# Patient Record
Sex: MALE | Race: AMERICAN INDIAN OR ALASKA NATIVE | NOT HISPANIC OR LATINO | URBAN - METROPOLITAN AREA
[De-identification: names, ages, dates, MRNs, and addresses within clinical notes are randomized per-mention and may not be internally consistent; named-entity substitution may affect disease eponyms.]

---

## 2024-04-28 ENCOUNTER — OFFICE VISIT (OUTPATIENT)
Dept: URGENT CARE | Facility: CLINIC | Age: 39
End: 2024-04-28
Payer: COMMERCIAL

## 2024-04-28 VITALS
OXYGEN SATURATION: 98 % | TEMPERATURE: 97.8 F | SYSTOLIC BLOOD PRESSURE: 122 MMHG | DIASTOLIC BLOOD PRESSURE: 76 MMHG | RESPIRATION RATE: 20 BRPM | HEART RATE: 96 BPM

## 2024-04-28 DIAGNOSIS — S61.411A LACERATION OF RIGHT HAND WITHOUT FOREIGN BODY, INITIAL ENCOUNTER: Primary | ICD-10-CM

## 2024-04-28 PROCEDURE — 99203 OFFICE O/P NEW LOW 30 MIN: CPT | Performed by: PHYSICIAN ASSISTANT

## 2024-04-28 PROCEDURE — 12002 RPR S/N/AX/GEN/TRNK2.6-7.5CM: CPT | Performed by: PHYSICIAN ASSISTANT

## 2024-04-28 RX ORDER — AMOXICILLIN AND CLAVULANATE POTASSIUM 875; 125 MG/1; MG/1
1 TABLET, FILM COATED ORAL 2 TIMES DAILY
Qty: 10 TABLET | Refills: 0 | Status: SHIPPED | OUTPATIENT
Start: 2024-04-28 | End: 2024-05-03

## 2024-04-28 ASSESSMENT — ENCOUNTER SYMPTOMS
WOUND: 1
ROS SKIN COMMENTS: LACERATION TO LEFT HAND

## 2024-04-28 ASSESSMENT — PAIN SCALES - GENERAL: PAINLEVEL: 2

## 2024-04-28 NOTE — PROGRESS NOTES
Subjective   Patient ID: Dalia Angulo is a 39 y.o. male.    Patient is a 39-year-old male who complains of laceration to the interdigital skin between his left second and third fingers secondary to a blunt trauma injury that the patient sustained prior to arrival this morning.  Patient was playing cricket when the ball struck his left second and third fingers.  Patient reports that he believes that his left second and third fingers were spread far apart and the interdigital skin torn.  Patient reports mild bleeding to the site and denies paresthesia or paralysis to his left second and third fingers.  Patient states he is able to flex and extend same without difficulty.  Patient has no additional complaints of pain or injury to his remaining left fingers and hand.  Patient states that his last tetanus immunization was 2 years ago.      Hand Injury  The following portions of the chart were reviewed this encounter and updated as appropriate:       Review of Systems   Skin:  Positive for wound.        Laceration to Left Hand   All other systems reviewed and are negative.  Objective   Physical Exam  Vitals and nursing note reviewed.   Constitutional:       Appearance: Normal appearance. He is normal weight.   Cardiovascular:      Pulses: Normal pulses.   Pulmonary:      Effort: Pulmonary effort is normal.      Breath sounds: Normal breath sounds.   Musculoskeletal:         General: No swelling, tenderness, deformity or signs of injury. Normal range of motion.   Skin:     General: Skin is warm and dry.      Capillary Refill: Capillary refill takes less than 2 seconds.      Findings: No bruising or erythema.      Comments: 2 cm irregular linear laceration is noted to the interdigital skin of the left second and third fingers.  Wound depth is 4 mm and the underlying fascial structures are fully intact.  Mild capillary bleeding is noted on exam and no foreign body is noted.  There is no visible muscle or bone tissue and the  tendon is visualized and confirmed to be fully intact.  MSP to the left second and third fingers is intact and the patient demonstrates full range of motion of same.  Fine and gross motor movements are intact and  is strong and equal.  Remainder of exam to the skin of the left fingers and hand is unremarkable.   Neurological:      General: No focal deficit present.      Mental Status: He is alert and oriented to person, place, and time.   Psychiatric:         Mood and Affect: Mood normal.         Behavior: Behavior normal.         Thought Content: Thought content normal.         Judgment: Judgment normal.     Assessment/Plan   Physical exam findings as noted above.  Patient was provided with prescription for Augmentin 875-125 mg and wound care instructions were discussed.  Patient was advised to return to this urgent care facility on Monday, 06 May 2024 for removal of the Prolene sutures.  Patient verbalizes clear understanding of the above instructions.    CLINICAL IMPRESSION:  2 cm Laceration Left Hand     Diagnoses and all orders for this visit:  Laceration of right hand without foreign body, initial encounter  -     amoxicillin-pot clavulanate (Augmentin) 875-125 mg tablet; Take 1 tablet by mouth 2 times a day for 5 days.    Patient ID: Dalia Angulo is a 39 y.o. male.    Laceration Repair    Date/Time: 4/28/2024 12:07 PM    Performed by: Allan Matute PA-C  Authorized by: Allan Matute PA-C    Comments:      Wound cleaned and irrigated per routine.  3 mL 1% lidocaine without epinephrine was infiltrated to the site with excellent effect noted.  Four 4-0 Vicryl sutures and two 4-0 Prolene sutures were placed myself in simple interrupted fashion with excellent approximation of the wound edges.  Antibiotic ointment was applied and a sterile dressing placed and secured with Coban by the RN.  MSP noted to be intact to the left second and third fingers pre- and post-suture placement.  Patient tolerated the  procedure very well.    Patient disposition: Home

## 2024-05-06 ENCOUNTER — OFFICE VISIT (OUTPATIENT)
Dept: URGENT CARE | Facility: CLINIC | Age: 39
End: 2024-05-06
Payer: COMMERCIAL

## 2024-05-06 VITALS — WEIGHT: 150 LBS | TEMPERATURE: 97.8 F

## 2024-05-06 DIAGNOSIS — Z48.02 ENCOUNTER FOR REMOVAL OF SUTURES: Primary | ICD-10-CM

## 2024-05-06 PROCEDURE — 99024 POSTOP FOLLOW-UP VISIT: CPT | Performed by: PHYSICIAN ASSISTANT

## 2024-05-06 NOTE — PROGRESS NOTES
Subjective   Patient ID: Dalia Angulo is a 39 y.o. male.    Patient is a 39-year-old male who arrives for removal of sutures that were placed to a laceration to his right hand on 28 April 2024.  Patient was seen at this urgent care facility on that date and for 4-0 Vicryl and two 4-0 Prolene sutures were placed myself to a laceration to the interdigital skin between the right second and third fingers.  Patient states the wound has been healing well and he has noted no bleeding, serous or purulent fluid.  Patient did complete the antibiotic that was prescribed as directed.  Patient states he has been applying antibiotic ointment to the site and reports no pain or swelling.  Patient has no complaints at the present time and arrives only for removal of the sutures as instructed.      Suture / Staple Removal     The following portions of the chart were reviewed this encounter and updated as appropriate:       Review of Systems   Skin:         Suture Removal Right Hand   All other systems reviewed and are negative.  Objective   Physical Exam  Vitals and nursing note reviewed.   Constitutional:       Appearance: Normal appearance. He is normal weight.   Cardiovascular:      Pulses: Normal pulses.   Pulmonary:      Effort: Pulmonary effort is normal.      Breath sounds: Normal breath sounds.   Musculoskeletal:         General: No swelling, tenderness, deformity or signs of injury. Normal range of motion.   Skin:     General: Skin is warm and dry.      Capillary Refill: Capillary refill takes less than 2 seconds.      Findings: No bruising or erythema.      Comments: Laceration to the interdigital skin between the right second and third fingers is noted to be healing extremely well.  All sutures are noted to be intact and in appropriate position.  There is no induration or fluctuance noted to the site.  No erythema, bleeding, serous or purulent fluid is noted.  MSP to the right fingers is fully intact and  is strong and  equal.   Neurological:      General: No focal deficit present.      Mental Status: He is alert and oriented to person, place, and time.   Psychiatric:         Mood and Affect: Mood normal.         Behavior: Behavior normal.         Thought Content: Thought content normal.         Judgment: Judgment normal.     Assessment/Plan   Physical exam findings as noted above.  Two 4-0 Prolene sutures were removed myself without complication and the count confirmed by the patient.  Additional wound care instructions were discussed and the patient verbalizes excellent understanding of same.    CLINICAL IMPRESSION:  Suture Removal Right Hand    Diagnoses and all orders for this visit:  Encounter for removal of sutures    Patient disposition: Home

## 2024-11-04 ENCOUNTER — APPOINTMENT (OUTPATIENT)
Dept: PRIMARY CARE | Facility: CLINIC | Age: 39
End: 2024-11-04
Payer: COMMERCIAL

## 2024-11-04 VITALS
SYSTOLIC BLOOD PRESSURE: 120 MMHG | DIASTOLIC BLOOD PRESSURE: 84 MMHG | HEIGHT: 70 IN | TEMPERATURE: 97.4 F | BODY MASS INDEX: 21.76 KG/M2 | WEIGHT: 152 LBS | OXYGEN SATURATION: 97 % | HEART RATE: 72 BPM

## 2024-11-04 DIAGNOSIS — Z00.00 ROUTINE GENERAL MEDICAL EXAMINATION AT A HEALTH CARE FACILITY: Primary | ICD-10-CM

## 2024-11-04 DIAGNOSIS — Z13.29 SCREENING FOR ENDOCRINE, METABOLIC AND IMMUNITY DISORDER: ICD-10-CM

## 2024-11-04 DIAGNOSIS — Z13.228 SCREENING FOR ENDOCRINE, METABOLIC AND IMMUNITY DISORDER: ICD-10-CM

## 2024-11-04 DIAGNOSIS — E55.9 VITAMIN D DEFICIENCY: ICD-10-CM

## 2024-11-04 DIAGNOSIS — Z13.0 SCREENING FOR ENDOCRINE, METABOLIC AND IMMUNITY DISORDER: ICD-10-CM

## 2024-11-04 DIAGNOSIS — E78.5 HYPERLIPIDEMIA, UNSPECIFIED HYPERLIPIDEMIA TYPE: ICD-10-CM

## 2024-11-04 PROBLEM — S61.411A LACERATION OF RIGHT HAND WITHOUT FOREIGN BODY: Status: RESOLVED | Noted: 2024-04-28 | Resolved: 2024-11-04

## 2024-11-04 PROBLEM — Z48.02 ENCOUNTER FOR REMOVAL OF SUTURES: Status: RESOLVED | Noted: 2024-05-06 | Resolved: 2024-11-04

## 2024-11-04 PROCEDURE — 99203 OFFICE O/P NEW LOW 30 MIN: CPT | Performed by: INTERNAL MEDICINE

## 2024-11-04 PROCEDURE — 90471 IMMUNIZATION ADMIN: CPT | Performed by: INTERNAL MEDICINE

## 2024-11-04 PROCEDURE — 1036F TOBACCO NON-USER: CPT | Performed by: INTERNAL MEDICINE

## 2024-11-04 PROCEDURE — 90656 IIV3 VACC NO PRSV 0.5 ML IM: CPT | Performed by: INTERNAL MEDICINE

## 2024-11-04 PROCEDURE — 3008F BODY MASS INDEX DOCD: CPT | Performed by: INTERNAL MEDICINE

## 2024-11-04 RX ORDER — PRAVASTATIN SODIUM 20 MG/1
20 TABLET ORAL DAILY
COMMUNITY

## 2024-11-04 ASSESSMENT — ENCOUNTER SYMPTOMS
NERVOUS/ANXIOUS: 0
BLOOD IN STOOL: 0
JOINT SWELLING: 0
TREMORS: 0
HEADACHES: 0
SEIZURES: 0
EYE PAIN: 0
VOMITING: 0
COUGH: 0
FREQUENCY: 0
CHILLS: 0
SLEEP DISTURBANCE: 0
DIARRHEA: 0
NAUSEA: 0
FLANK PAIN: 0
BACK PAIN: 0
TROUBLE SWALLOWING: 0
NUMBNESS: 0
CONFUSION: 0
DIZZINESS: 0
CONSTIPATION: 0
EYE DISCHARGE: 0
SORE THROAT: 0
APPETITE CHANGE: 0
WEAKNESS: 0
SHORTNESS OF BREATH: 0
DYSURIA: 0
HEMATURIA: 0
ABDOMINAL PAIN: 0
UNEXPECTED WEIGHT CHANGE: 0
WHEEZING: 0
PALPITATIONS: 0
WOUND: 0
FEVER: 0

## 2024-11-04 NOTE — PROGRESS NOTES
"Subjective   Patient ID: Dalia Angulo is a 39 y.o. male who presents for Employment Physical (Flu shot.).    HPI   [facility] PT ( [facility])  Here to establish( unable to get appt at [facility]). This is NOT an emplyment physical but pt stated gets incentives from his insurance if has labs done by end of the year.   Chart reviewed, PMHX, meds,  Social HX- updated at visit   Labs( 2023-24 ) reviewed      Review of Systems   Constitutional:  Negative for appetite change, chills, fever and unexpected weight change.   HENT:  Negative for congestion, ear pain, sneezing, sore throat and trouble swallowing.    Eyes:  Negative for pain, discharge and visual disturbance.   Respiratory:  Negative for cough, shortness of breath and wheezing.    Cardiovascular:  Negative for chest pain, palpitations and leg swelling.   Gastrointestinal:  Negative for abdominal pain, blood in stool, constipation, diarrhea, nausea and vomiting.   Genitourinary:  Negative for dysuria, flank pain, frequency, hematuria and urgency.   Musculoskeletal:  Negative for back pain, gait problem and joint swelling.   Skin:  Negative for rash and wound.   Neurological:  Negative for dizziness, tremors, seizures, syncope, weakness, numbness and headaches.   Psychiatric/Behavioral:  Negative for confusion, sleep disturbance and suicidal ideas. The patient is not nervous/anxious.        Objective   /84   Pulse 72   Temp 36.3 °C (97.4 °F)   Ht 1.778 m (5' 10\")   Wt 68.9 kg (152 lb)   SpO2 97%   BMI 21.81 kg/m²          Physical Exam  Vitals and nursing note reviewed.   Constitutional:       General: He is not in acute distress.     Appearance: Normal appearance.   HENT:      Head: Normocephalic and atraumatic.      Right Ear: Tympanic membrane and ear canal normal.      Left Ear: Tympanic membrane and ear canal normal.      Nose: Nose normal.      Mouth/Throat:      Mouth: Mucous membranes are moist.      Pharynx: Oropharynx is clear.   Eyes:      Extraocular " Reason for Call:  Other Letter    Detailed comments: Letter needed for travel as he recently tested positive for COVID. Onset of symptoms started 12/31/21. Took two home tests same day and they were positive. Leaving for trip on Sunday 1/9/22. Has a scheduled PCR test for Friday 1/7/22.      Phone Number Patient can be reached at: Other phone number:  [phone]    Best Time: Any    Can we leave a detailed message on this number? YES    Call taken on 1/5/2022 at 11:58 AM by Minesh Lim       Movements: Extraocular movements intact.      Conjunctiva/sclera: Conjunctivae normal.      Pupils: Pupils are equal, round, and reactive to light.   Cardiovascular:      Rate and Rhythm: Normal rate and regular rhythm.      Heart sounds: No murmur heard.  Pulmonary:      Effort: Pulmonary effort is normal. No respiratory distress.      Breath sounds: Normal breath sounds. No wheezing or rhonchi.   Abdominal:      General: Bowel sounds are normal.      Palpations: Abdomen is soft.      Tenderness: There is no abdominal tenderness.   Musculoskeletal:         General: Normal range of motion.      Cervical back: Neck supple.      Right lower leg: No edema.      Left lower leg: No edema.   Skin:     General: Skin is warm and dry.      Findings: No bruising or rash.   Neurological:      General: No focal deficit present.      Mental Status: He is alert and oriented to person, place, and time.      Motor: No weakness.      Gait: Gait normal.   Psychiatric:         Mood and Affect: Mood normal.         Behavior: Behavior normal.         Assessment/Plan   Assessment & Plan  Routine general medical examination at a health care facility      - Counseled continued efforts on healthy lifestyle modification including balanced diet, and continued exercise for >5 minutes-He excercises 3 x/week ( cardio and weight)  - counseled age appropriate vaccines and preventative measures     Orders:    CBC and Auto Differential; Future    Comprehensive Metabolic Panel; Future    TSH with reflex to Free T4 if abnormal; Future    Hemoglobin A1C; Future    Vitamin D 25-Hydroxy,Total (for eval of Vitamin D levels); Future    Lipid Panel; Future    Hyperlipidemia, unspecified hyperlipidemia type  - most recent ( Jan 2024) down from 180 ( in 2022)  - he was seeing a lipidologist at [facility] and formerly on Crestor but was having blood sugar excursions hence Monse was DC , now on Pravachol   Orders:    Lipid Panel; Future    Screening for  endocrine, metabolic and immunity disorder    Orders:    TSH with reflex to Free T4 if abnormal; Future    Hemoglobin A1C; Future    Vitamin D deficiency  - he is taking unknown dose OTC Vit D   Orders:    Vitamin D 25-Hydroxy,Total (for eval of Vitamin D levels); Future

## 2024-11-04 NOTE — ASSESSMENT & PLAN NOTE
- most recent ( Jan 2024) down from 180 ( in 2022)  - he was seeing a lipidologist at [facility] and formerly on Crestor but was having blood sugar excursions hence Monse was DC , now on Pravachol   Orders:    Lipid Panel; Future

## 2024-11-05 ENCOUNTER — LAB (OUTPATIENT)
Dept: LAB | Facility: LAB | Age: 39
End: 2024-11-05
Payer: COMMERCIAL

## 2024-11-05 DIAGNOSIS — Z13.29 SCREENING FOR ENDOCRINE, METABOLIC AND IMMUNITY DISORDER: ICD-10-CM

## 2024-11-05 DIAGNOSIS — Z00.00 ROUTINE GENERAL MEDICAL EXAMINATION AT A HEALTH CARE FACILITY: ICD-10-CM

## 2024-11-05 DIAGNOSIS — Z13.228 SCREENING FOR ENDOCRINE, METABOLIC AND IMMUNITY DISORDER: ICD-10-CM

## 2024-11-05 DIAGNOSIS — E55.9 VITAMIN D DEFICIENCY: ICD-10-CM

## 2024-11-05 DIAGNOSIS — Z13.0 SCREENING FOR ENDOCRINE, METABOLIC AND IMMUNITY DISORDER: ICD-10-CM

## 2024-11-05 DIAGNOSIS — E78.5 HYPERLIPIDEMIA, UNSPECIFIED HYPERLIPIDEMIA TYPE: ICD-10-CM

## 2024-11-05 LAB
25(OH)D3 SERPL-MCNC: 39 NG/ML (ref 30–100)
ALBUMIN SERPL BCP-MCNC: 4.4 G/DL (ref 3.4–5)
ALP SERPL-CCNC: 69 U/L (ref 33–120)
ALT SERPL W P-5'-P-CCNC: 17 U/L (ref 10–52)
ANION GAP SERPL CALC-SCNC: 9 MMOL/L (ref 10–20)
AST SERPL W P-5'-P-CCNC: 17 U/L (ref 9–39)
BASOPHILS # BLD AUTO: 0.03 X10*3/UL (ref 0–0.1)
BASOPHILS NFR BLD AUTO: 0.4 %
BILIRUB SERPL-MCNC: 0.5 MG/DL (ref 0–1.2)
BUN SERPL-MCNC: 18 MG/DL (ref 6–23)
CALCIUM SERPL-MCNC: 9.4 MG/DL (ref 8.6–10.3)
CHLORIDE SERPL-SCNC: 104 MMOL/L (ref 98–107)
CHOLEST SERPL-MCNC: 191 MG/DL (ref 0–199)
CHOLESTEROL/HDL RATIO: 5.1
CO2 SERPL-SCNC: 30 MMOL/L (ref 21–32)
CREAT SERPL-MCNC: 1.03 MG/DL (ref 0.5–1.3)
EGFRCR SERPLBLD CKD-EPI 2021: >90 ML/MIN/1.73M*2
EOSINOPHIL # BLD AUTO: 0.12 X10*3/UL (ref 0–0.7)
EOSINOPHIL NFR BLD AUTO: 1.6 %
ERYTHROCYTE [DISTWIDTH] IN BLOOD BY AUTOMATED COUNT: 12.9 % (ref 11.5–14.5)
EST. AVERAGE GLUCOSE BLD GHB EST-MCNC: 117 MG/DL
GLUCOSE SERPL-MCNC: 84 MG/DL (ref 74–99)
HBA1C MFR BLD: 5.7 %
HCT VFR BLD AUTO: 44 % (ref 41–52)
HDLC SERPL-MCNC: 37.5 MG/DL
HGB BLD-MCNC: 14.4 G/DL (ref 13.5–17.5)
IMM GRANULOCYTES # BLD AUTO: 0.03 X10*3/UL (ref 0–0.7)
IMM GRANULOCYTES NFR BLD AUTO: 0.4 % (ref 0–0.9)
LDLC SERPL CALC-MCNC: 132 MG/DL
LYMPHOCYTES # BLD AUTO: 2.8 X10*3/UL (ref 1.2–4.8)
LYMPHOCYTES NFR BLD AUTO: 37.3 %
MCH RBC QN AUTO: 29.6 PG (ref 26–34)
MCHC RBC AUTO-ENTMCNC: 32.7 G/DL (ref 32–36)
MCV RBC AUTO: 91 FL (ref 80–100)
MONOCYTES # BLD AUTO: 0.48 X10*3/UL (ref 0.1–1)
MONOCYTES NFR BLD AUTO: 6.4 %
NEUTROPHILS # BLD AUTO: 4.05 X10*3/UL (ref 1.2–7.7)
NEUTROPHILS NFR BLD AUTO: 53.9 %
NON HDL CHOLESTEROL: 154 MG/DL (ref 0–149)
NRBC BLD-RTO: 0 /100 WBCS (ref 0–0)
PLATELET # BLD AUTO: 185 X10*3/UL (ref 150–450)
POTASSIUM SERPL-SCNC: 4.2 MMOL/L (ref 3.5–5.3)
PROT SERPL-MCNC: 7.6 G/DL (ref 6.4–8.2)
RBC # BLD AUTO: 4.86 X10*6/UL (ref 4.5–5.9)
SODIUM SERPL-SCNC: 139 MMOL/L (ref 136–145)
TRIGL SERPL-MCNC: 109 MG/DL (ref 0–149)
TSH SERPL-ACNC: 1.86 MIU/L (ref 0.44–3.98)
VLDL: 22 MG/DL (ref 0–40)
WBC # BLD AUTO: 7.5 X10*3/UL (ref 4.4–11.3)

## 2024-11-05 PROCEDURE — 84443 ASSAY THYROID STIM HORMONE: CPT

## 2024-11-05 PROCEDURE — 85025 COMPLETE CBC W/AUTO DIFF WBC: CPT

## 2024-11-05 PROCEDURE — 82306 VITAMIN D 25 HYDROXY: CPT

## 2024-11-05 PROCEDURE — 80061 LIPID PANEL: CPT

## 2024-11-05 PROCEDURE — 83036 HEMOGLOBIN GLYCOSYLATED A1C: CPT

## 2024-11-05 PROCEDURE — 36415 COLL VENOUS BLD VENIPUNCTURE: CPT

## 2024-11-05 PROCEDURE — 80053 COMPREHEN METABOLIC PANEL: CPT
